# Patient Record
Sex: FEMALE | Race: BLACK OR AFRICAN AMERICAN | NOT HISPANIC OR LATINO | Employment: UNEMPLOYED | URBAN - METROPOLITAN AREA
[De-identification: names, ages, dates, MRNs, and addresses within clinical notes are randomized per-mention and may not be internally consistent; named-entity substitution may affect disease eponyms.]

---

## 2021-12-26 ENCOUNTER — HOSPITAL ENCOUNTER (EMERGENCY)
Facility: HOSPITAL | Age: 21
Discharge: HOME | End: 2021-12-26
Attending: EMERGENCY MEDICINE
Payer: MEDICAID

## 2021-12-26 VITALS
SYSTOLIC BLOOD PRESSURE: 141 MMHG | RESPIRATION RATE: 19 BRPM | HEART RATE: 89 BPM | OXYGEN SATURATION: 96 % | DIASTOLIC BLOOD PRESSURE: 77 MMHG | TEMPERATURE: 100 F

## 2021-12-26 DIAGNOSIS — U07.1 COVID-19: Primary | ICD-10-CM

## 2021-12-26 LAB
CTP QC/QA: YES
CTP QC/QA: YES
POC MOLECULAR INFLUENZA A AGN: NEGATIVE
POC MOLECULAR INFLUENZA B AGN: NEGATIVE
SARS-COV-2 RDRP RESP QL NAA+PROBE: POSITIVE

## 2021-12-26 PROCEDURE — 99283 EMERGENCY DEPT VISIT LOW MDM: CPT | Mod: 25

## 2021-12-26 PROCEDURE — U0002 COVID-19 LAB TEST NON-CDC: HCPCS | Performed by: EMERGENCY MEDICINE

## 2021-12-26 PROCEDURE — 25000003 PHARM REV CODE 250: Performed by: EMERGENCY MEDICINE

## 2021-12-26 RX ORDER — ONDANSETRON 4 MG/1
4 TABLET, ORALLY DISINTEGRATING ORAL EVERY 8 HOURS PRN
Qty: 11 TABLET | Refills: 0 | Status: SHIPPED | OUTPATIENT
Start: 2021-12-26 | End: 2021-12-29

## 2021-12-26 RX ORDER — ONDANSETRON 4 MG/1
4 TABLET, ORALLY DISINTEGRATING ORAL
Status: COMPLETED | OUTPATIENT
Start: 2021-12-26 | End: 2021-12-26

## 2021-12-26 RX ADMIN — ONDANSETRON 4 MG: 4 TABLET, ORALLY DISINTEGRATING ORAL at 05:12

## 2021-12-26 NOTE — Clinical Note
"Patti "Clarisa Kaur was seen and treated in our emergency department on 12/26/2021.     COVID-19 is present in our communities across the state. There is limited testing for COVID at this time, so not all patients can be tested. In this situation, your employee meets the following criteria:    Patti Kaur has met the criteria for COVID-19 testing and has a POSITIVE result. She can return to work once they are asymptomatic for 72 hours without the use of fever reducing medications AND at least ten days from the first positive result.     If you have any questions or concerns, or if I can be of further assistance, please do not hesitate to contact me.    Sincerely,             Cesar Mello MD"

## 2021-12-26 NOTE — ED PROVIDER NOTES
SCRIBE #1 NOTE: I, Cuate Jose, am scribing for, and in the presence of, Cesar Mello MD. I have scribed the entire note.       History     Chief Complaint   Patient presents with    COVID-19 Concerns     Pt reports that she has a fever and that she might be covid postive and wants to be tested.      Review of patient's allergies indicates:  No Known Allergies      History of Present Illness     HPI    12/26/2021, 4:44 PM  History obtained from the patient      History of Present Illness: Patti Kaur is a 21 y.o. female patient who presents to the Emergency Department for evaluation of COVID-19 concerns which onset gradually x 1 day pta. Pt is not vaccinated against COVID-19. Symptoms are constant and moderate in severity. No mitigating or exacerbating factors reported. Associated sxs include fever, n/v, generalized myalgias, cough, rhinorrhea and HA. Patient denies any SOB, CP, diarrhea, abd pain, dysuria, and all other sxs at this time. No prior tx reported. No further complaints or concerns at this time.       Arrival mode: Personal vehicle    PCP: Primary Doctor No        Past Medical History:  No past medical history on file.    Past Surgical History:  No past surgical history on file.      Family History:  No family history on file.    Social History:  Social History     Tobacco Use    Smoking status: Not on file    Smokeless tobacco: Not on file   Substance and Sexual Activity    Alcohol use: Not on file    Drug use: Not on file    Sexual activity: Not on file        Review of Systems     Review of Systems   Constitutional: Positive for fever. Negative for chills.   HENT: Positive for rhinorrhea. Negative for sore throat.    Respiratory: Positive for cough. Negative for shortness of breath.    Cardiovascular: Negative for chest pain.   Gastrointestinal: Positive for nausea and vomiting. Negative for abdominal pain and diarrhea.   Genitourinary: Negative for dysuria.   Musculoskeletal: Positive  for myalgias (Generalized). Negative for back pain.   Skin: Negative for rash.   Neurological: Positive for headaches. Negative for weakness.   Hematological: Does not bruise/bleed easily.   All other systems reviewed and are negative.     Physical Exam     Initial Vitals [12/26/21 1628]   BP Pulse Resp Temp SpO2   (!) 141/77 89 19 100.1 °F (37.8 °C) 96 %      MAP       --          Physical Exam  Nursing Notes and Vital Signs Reviewed.  Constitutional: Patient is in no acute distress. Well-developed and well-nourished.  Head: Atraumatic. Normocephalic.  Eyes: PERRL. EOM intact. Conjunctivae are not pale. No scleral icterus.  ENT: Mucous membranes are moist. Oropharynx is clear and symmetric.    Neck: Supple. Full ROM. No lymphadenopathy.  Cardiovascular: Regular rate. Regular rhythm. No murmurs, rubs, or gallops. Distal pulses are 2+ and symmetric.  Pulmonary/Chest: No respiratory distress. Clear to auscultation bilaterally. No wheezing or rales.  Abdominal: Soft and non-distended.  There is no tenderness.  No rebound, guarding, or rigidity. Good bowel sounds.  Genitourinary: No CVA tenderness  Musculoskeletal: Moves all extremities. No obvious deformities. No edema. No calf tenderness.  Skin: Warm and dry.  Neurological:  Alert, awake, and appropriate.  Normal speech.  No acute focal neurological deficits are appreciated.  Psychiatric: Normal affect. Good eye contact. Appropriate in content.     ED Course   Procedures  ED Vital Signs:  Vitals:    12/26/21 1628   BP: (!) 141/77   Pulse: 89   Resp: 19   Temp: 100.1 °F (37.8 °C)   TempSrc: Oral   SpO2: 96%       Abnormal Lab Results:  Labs Reviewed   SARS-COV-2 RDRP GENE - Abnormal; Notable for the following components:       Result Value    POC Rapid COVID Positive (*)     All other components within normal limits    Narrative:     This test utilizes isothermal nucleic acid amplification   technology to detect the SARS-CoV-2 RdRp nucleic acid segment.   The  analytical sensitivity (limit of detection) is 125 genome   equivalents/mL.   A POSITIVE result implies infection with the SARS-CoV-2 virus;   the patient is presumed to be contagious.     A NEGATIVE result means that SARS-CoV-2 nucleic acids are not   present above the limit of detection. A NEGATIVE result should be   treated as presumptive. It does not rule out the possibility of   COVID-19 and should not be the sole basis for treatment decisions.   If COVID-19 is strongly suspected based on clinical and exposure   history, re-testing using an alternate molecular assay should be   considered.   This test is only for use under the Food and Drug   Administration s Emergency Use Authorization (EUA).   Commercial kits are provided by [employer].   Performance characteristics of the EUA have been independently   verified by [facility] Department of   Pathology and Laboratory Medicine.   _________________________________________________________________   The authorized Fact Sheet for Healthcare Providers and the authorized Fact   Sheet for Patients of the ID NOW COVID-19 are available on the FDA   website:     https://www.fda.gov/media/496441/download  https://www.fda.gov/media/687412/download       POCT INFLUENZA A/B MOLECULAR    Narrative:     This test utilizes isothermal nucleic acid amplification   technology to detect the SARS-CoV-2 RdRp nucleic acid segment.   The analytical sensitivity (limit of detection) is 125 genome   equivalents/mL.   A POSITIVE result implies infection with the SARS-CoV-2 virus;   the patient is presumed to be contagious.     A NEGATIVE result means that SARS-CoV-2 nucleic acids are not   present above the limit of detection. A NEGATIVE result should be   treated as presumptive. It does not rule out the possibility of   COVID-19 and should not be the sole basis for treatment decisions.   If COVID-19 is strongly suspected based on clinical and exposure   history,  re-testing using an alternate molecular assay should be   considered.   This test is only for use under the Food and Drug   Administration s Emergency Use Authorization (EUA).   Commercial kits are provided by [employer].   Performance characteristics of the EUA have been independently   verified by [facility] Department of   Pathology and Laboratory Medicine.   _________________________________________________________________   The authorized Fact Sheet for Healthcare Providers and the authorized Fact   Sheet for Patients of the ID NOW COVID-19 are available on the FDA   website:     https://www.fda.gov/media/948106/download  https://www.fda.gov/media/365492/download            All Lab Results:  Results for orders placed or performed during the hospital encounter of 12/26/21   POCT COVID-19 Rapid Screening   Result Value Ref Range    POC Rapid COVID Positive (A) Negative     Acceptable Yes    POCT Influenza A/B Molecular   Result Value Ref Range    POC Molecular Influenza A Ag Negative Negative, Not Reported    POC Molecular Influenza B Ag Negative Negative, Not Reported     Acceptable Yes          Imaging Results:  Imaging Results    None              The Emergency Provider reviewed the vital signs and test results, which are outlined above.     ED Discussion     5:05 PM: Reassessed pt at this time.Discussed with pt all pertinent ED information and results. Discussed pt dx and plan of tx. Gave pt all f/u and return to the ED instructions. All questions and concerns were addressed at this time. Pt expresses understanding of information and instructions, and is comfortable with plan to discharge. Pt is stable for discharge.    I discussed with patient and/or family/caretaker that evaluation in the ED does not suggest any emergent or life threatening medical conditions requiring immediate intervention beyond what was provided in the ED, and I believe patient is safe  for discharge.  Regardless, an unremarkable evaluation in the ED does not preclude the development or presence of a serious of life threatening condition. As such, patient was instructed to return immediately for any worsening or change in current symptoms.       Medical Decision Making:   Clinical Tests:   Lab Tests: Ordered and Reviewed           ED Medication(s):  Medications   ondansetron disintegrating tablet 4 mg (4 mg Oral Given 12/26/21 1702)       Discharge Medication List as of 12/26/2021  5:05 PM      START taking these medications    Details   ondansetron (ZOFRAN-ODT) 4 MG TbDL Take 1 tablet (4 mg total) by mouth every 8 (eight) hours as needed (nausea)., Starting Sun 12/26/2021, Until Wed 12/29/2021 at 1571, Print              Follow-up Information     [facility] - Emergency Dept.. Go today.    Specialty: Emergency Medicine  Why: If symptoms worsen, For re-evaluation and further treatment, As needed  Contact information:  [id number] [place] [id number] [phone]           Your Primary Care Provider. Call in 2 days.    Why: For re-evaluation and further treatment                           Scribe Attestation:   Scribe #1: I performed the above scribed service and the documentation accurately describes the services I performed. I attest to the accuracy of the note.     Attending:   Physician Attestation Statement for Scribe #1: I, Cesar Mello MD, personally performed the services described in this documentation, as scribed by Cuate Garcia, in my presence, and it is both accurate and complete.           Clinical Impression       ICD-10-CM ICD-9-CM   1. COVID-19  U07.1 079.89       Disposition:   Disposition: Discharged  Condition: Stable         Cesar Mello MD  12/27/21 1410

## 2022-05-29 ENCOUNTER — HOSPITAL ENCOUNTER (EMERGENCY)
Facility: HOSPITAL | Age: 22
Discharge: HOME | End: 2022-05-29
Attending: EMERGENCY MEDICINE
Payer: MEDICAID

## 2022-05-29 VITALS
SYSTOLIC BLOOD PRESSURE: 128 MMHG | WEIGHT: 136 LBS | DIASTOLIC BLOOD PRESSURE: 71 MMHG | RESPIRATION RATE: 18 BRPM | OXYGEN SATURATION: 97 % | HEART RATE: 97 BPM | TEMPERATURE: 101 F

## 2022-05-29 DIAGNOSIS — U07.1 COVID-19: Primary | ICD-10-CM

## 2022-05-29 PROCEDURE — 87502 INFLUENZA DNA AMP PROBE: CPT

## 2022-05-29 PROCEDURE — 99283 EMERGENCY DEPT VISIT LOW MDM: CPT

## 2022-05-29 PROCEDURE — U0002 COVID-19 LAB TEST NON-CDC: HCPCS | Performed by: REGISTERED NURSE

## 2022-05-29 PROCEDURE — 25000003 PHARM REV CODE 250: Performed by: REGISTERED NURSE

## 2022-05-29 RX ORDER — ONDANSETRON 4 MG/1
4 TABLET, ORALLY DISINTEGRATING ORAL
Status: COMPLETED | OUTPATIENT
Start: 2022-05-29 | End: 2022-05-29

## 2022-05-29 RX ORDER — ACETAMINOPHEN 500 MG
1000 TABLET ORAL
Status: COMPLETED | OUTPATIENT
Start: 2022-05-29 | End: 2022-05-29

## 2022-05-29 RX ADMIN — ONDANSETRON 4 MG: 4 TABLET, ORALLY DISINTEGRATING ORAL at 09:05

## 2022-05-29 RX ADMIN — ACETAMINOPHEN 1000 MG: 500 TABLET ORAL at 09:05

## 2022-05-30 NOTE — ED PROVIDER NOTES
Encounter Date: 5/29/2022       History     Chief Complaint   Patient presents with    COVID-19 Concerns     States started with sore throat headache , body aches, N/V today     The history is provided by the patient.   22-year-old female presents emergency department with complaints of sore throat, headache, body aches and nausea vomiting that began today.  Patient denies any abdominal pain, shortness of breath, chest pain or any other symptoms at this time.    Review of patient's allergies indicates:  No Known Allergies  No past medical history on file.  No past surgical history on file.  No family history on file.     Review of Systems   Constitutional: Positive for activity change, appetite change and fatigue. Negative for fever.   HENT: Positive for sore throat.    Respiratory: Negative for shortness of breath.    Cardiovascular: Negative for chest pain.   Gastrointestinal: Positive for vomiting. Negative for nausea.   Genitourinary: Negative for dysuria.   Musculoskeletal: Positive for myalgias. Negative for back pain.   Skin: Negative for rash.   Neurological: Positive for headaches. Negative for weakness.   Hematological: Does not bruise/bleed easily.   All other systems reviewed and are negative.      Physical Exam     Initial Vitals [05/29/22 2055]   BP Pulse Resp Temp SpO2   128/71 97 18 (!) 101.1 °F (38.4 °C) 97 %      MAP       --         Physical Exam    Constitutional: She appears well-developed and well-nourished. She is not diaphoretic. No distress.   HENT:   Head: Normocephalic and atraumatic.   Eyes: Conjunctivae and EOM are normal. Pupils are equal, round, and reactive to light.   Neck: Neck supple.   Normal range of motion.  Cardiovascular: Normal rate, regular rhythm and normal heart sounds.   No murmur heard.  Pulmonary/Chest: Breath sounds normal. No respiratory distress. She has no wheezes. She has no rales.   Abdominal: Abdomen is soft. Bowel sounds are normal. There is no abdominal  tenderness. There is no rebound and no guarding.   Musculoskeletal:         General: No tenderness or edema. Normal range of motion.      Cervical back: Normal range of motion and neck supple.     Neurological: She is alert and oriented to person, place, and time. No cranial nerve deficit. GCS score is 15. GCS eye subscore is 4. GCS verbal subscore is 5. GCS motor subscore is 6.   Skin: Skin is warm and dry. Capillary refill takes less than 2 seconds.   Psychiatric: She has a normal mood and affect. Thought content normal.         ED Course   Procedures  Labs Reviewed   SARS-COV-2 RDRP GENE - Abnormal; Notable for the following components:       Result Value    POC Rapid COVID Positive (*)     All other components within normal limits   INFLUENZA A & B BY MOLECULAR   SARS-COV-2 RNA AMPLIFICATION, QUAL   POCT INFLUENZA A/B MOLECULAR          Imaging Results    None          Medications   acetaminophen tablet 1,000 mg (1,000 mg Oral Given 5/29/22 2105)   ondansetron disintegrating tablet 4 mg (4 mg Oral Given 5/29/22 2134)          I discussed with patient and/or family/caretaker that evaluation in the ED does not suggest any emergent or life threatening medical conditions requiring immediate intervention beyond what was provided in the ED, and I believe patient is safe for discharge.  Regardless, an unremarkable evaluation in the ED does not preclude the development or presence of a serious of life threatening condition. As such, patient was instructed to return immediately for any worsening or change in current symptoms.                   Clinical Impression:   Final diagnoses:  [U07.1] COVID-19 (Primary)          ED Disposition Condition    Discharge Stable        ED Prescriptions     None        Follow-up Information     Follow up With Specialties Details Why Contact Info    [facility] - Emergency Dept. Emergency Medicine  If symptoms worsen [id number] [facility] [address] [phone]            John Edward Jr., [facility]  05/30/22 0039

## 2022-05-31 ENCOUNTER — PATIENT OUTREACH (OUTPATIENT)
Dept: EMERGENCY MEDICINE | Facility: HOSPITAL | Age: 22
End: 2022-05-31
Payer: MEDICAID

## 2022-06-14 ENCOUNTER — PATIENT OUTREACH (OUTPATIENT)
Dept: EMERGENCY MEDICINE | Facility: HOSPITAL | Age: 22
End: 2022-06-14
Payer: MEDICAID

## 2022-06-28 ENCOUNTER — PATIENT OUTREACH (OUTPATIENT)
Dept: EMERGENCY MEDICINE | Facility: HOSPITAL | Age: 22
End: 2022-06-28
Payer: MEDICAID

## 2022-07-13 ENCOUNTER — PATIENT OUTREACH (OUTPATIENT)
Dept: ADMINISTRATIVE | Facility: HOSPITAL | Age: 22
End: 2022-07-13
Payer: MEDICAID

## 2022-07-13 NOTE — PROGRESS NOTES
Called patient to confirm hospital follow up scheduled on 7/15/2022.   Patient is not confirmed.  LM to confirm appt

## 2022-07-15 ENCOUNTER — TELEPHONE (OUTPATIENT)
Dept: PRIMARY CARE CLINIC | Facility: CLINIC | Age: 22
End: 2022-07-15
Payer: MEDICAID

## 2022-09-19 ENCOUNTER — HOSPITAL ENCOUNTER (EMERGENCY)
Facility: HOSPITAL | Age: 22
Discharge: HOME | End: 2022-09-19
Attending: EMERGENCY MEDICINE
Payer: MEDICAID

## 2022-09-19 VITALS
TEMPERATURE: 99 F | HEART RATE: 57 BPM | SYSTOLIC BLOOD PRESSURE: 143 MMHG | DIASTOLIC BLOOD PRESSURE: 83 MMHG | OXYGEN SATURATION: 97 % | WEIGHT: 132.06 LBS | RESPIRATION RATE: 18 BRPM

## 2022-09-19 DIAGNOSIS — R21 RASH AND NONSPECIFIC SKIN ERUPTION: Primary | ICD-10-CM

## 2022-09-19 PROCEDURE — 99284 EMERGENCY DEPT VISIT MOD MDM: CPT

## 2022-09-19 RX ORDER — PREDNISONE 20 MG/1
40 TABLET ORAL DAILY
Qty: 10 TABLET | Refills: 0 | Status: SHIPPED | OUTPATIENT
Start: 2022-09-19 | End: 2022-09-24

## 2022-09-19 RX ORDER — HYDROXYZINE HYDROCHLORIDE 25 MG/1
25 TABLET, FILM COATED ORAL 3 TIMES DAILY
Qty: 20 TABLET | Refills: 0 | Status: SHIPPED | OUTPATIENT
Start: 2022-09-19 | End: 2023-02-27

## 2022-09-19 RX ORDER — PERMETHRIN 50 MG/G
CREAM TOPICAL
Qty: 60 G | Refills: 1 | Status: SHIPPED | OUTPATIENT
Start: 2022-09-19 | End: 2023-02-27

## 2022-09-19 NOTE — ED PROVIDER NOTES
Encounter Date: 9/19/2022       History     Chief Complaint   Patient presents with    Rash     Pt reports that she went to [facility] for a few days and when she got out she began to experience bumps throughout her body      Patient is a 22-year-old female who presents with complaints of rash to the elbows, axilla and posterior neck.  Onset of symptoms 2 days ago.  Patient reports rash started after getting out of [facility].  States that she has itching all over body.  Patient denies any allergies.  No distress this time.    Review of patient's allergies indicates:  No Known Allergies  No past medical history on file.  No past surgical history on file.  No family history on file.     Review of Systems   Constitutional:  Negative for fever.   HENT:  Negative for sore throat.    Respiratory:  Negative for shortness of breath.    Cardiovascular:  Negative for chest pain.   Gastrointestinal:  Negative for nausea.   Genitourinary:  Negative for dysuria.   Musculoskeletal:  Negative for back pain.   Skin:  Positive for rash.   Neurological:  Negative for weakness.   Hematological:  Does not bruise/bleed easily.     Physical Exam     Initial Vitals [09/19/22 0025]   BP Pulse Resp Temp SpO2   (!) 143/83 (!) 57 18 98.7 °F (37.1 °C) 97 %      MAP       --         Physical Exam    Nursing note and vitals reviewed.  Constitutional: She appears well-developed and well-nourished.   HENT:   Head: Normocephalic and atraumatic.   Eyes: EOM are normal. Pupils are equal, round, and reactive to light.   Neck: Neck supple.   Normal range of motion.  Cardiovascular:  Normal rate, regular rhythm, normal heart sounds and intact distal pulses.           Pulmonary/Chest: Breath sounds normal.   Abdominal: Abdomen is soft. Bowel sounds are normal.   Musculoskeletal:         General: Normal range of motion.      Cervical back: Normal range of motion and neck supple.     Neurological: She is alert and oriented to person, place, and time. She has normal  strength and normal reflexes.   Skin: Skin is warm and dry. Rash (rash noted to the elbows, neck, axilla that appears consistent to allergic reaction versus scabies.) noted.       ED Course   Procedures  Labs Reviewed - No data to display       Imaging Results    None          Medications - No data to display  Medical Decision Making:   ED Management:  I discussed with patient and/or family/caretaker that evaluation in the ED does not suggest any emergent or life threatening medical conditions requiring immediate intervention beyond what was provided in the ED, and I believe patient is safe for discharge. Regardless, an unremarkable evaluation in the ED does not preclude the development or presence of a serious of life threatening condition. As such, patient was instructed to return immediately for any worsening or change in current symptoms.                          Clinical Impression:   Final diagnoses:  [R21] Rash and nonspecific skin eruption (Primary)      ED Disposition Condition    Discharge Stable          ED Prescriptions       Medication Sig Dispense Start Date End Date Auth. Provider    permethrin (ELIMITE) 5 % cream Apply to affected area once 60 g 9/19/2022 -- Ronal Mitchell NP    predniSONE (DELTASONE) 20 MG tablet Take 2 tablets (40 mg total) by mouth once daily. for 5 days 10 tablet 9/19/2022 9/24/2022 Ronal Mitchell NP    hydrOXYzine HCL (ATARAX) 25 MG tablet Take 1 tablet (25 mg total) by mouth 3 (three) times daily. 20 tablet 9/19/2022 -- Ronal Mitchell NP          Follow-up Information    None          Ronal Mitchell NP  09/19/22 0139

## 2023-02-27 ENCOUNTER — OFFICE VISIT (OUTPATIENT)
Dept: OBSTETRICS AND GYNECOLOGY | Facility: CLINIC | Age: 23
End: 2023-02-27
Payer: MEDICAID

## 2023-02-27 ENCOUNTER — LAB VISIT (OUTPATIENT)
Dept: LAB | Facility: HOSPITAL | Age: 23
End: 2023-02-27
Attending: NURSE PRACTITIONER
Payer: MEDICAID

## 2023-02-27 VITALS — DIASTOLIC BLOOD PRESSURE: 68 MMHG | SYSTOLIC BLOOD PRESSURE: 113 MMHG | WEIGHT: 136.25 LBS

## 2023-02-27 DIAGNOSIS — Z11.3 SCREENING FOR STD (SEXUALLY TRANSMITTED DISEASE): Primary | ICD-10-CM

## 2023-02-27 DIAGNOSIS — Z11.3 SCREENING FOR STD (SEXUALLY TRANSMITTED DISEASE): ICD-10-CM

## 2023-02-27 PROCEDURE — 99999 PR PBB SHADOW E&M-EST. PATIENT-LVL II: CPT | Mod: PBBFAC,,, | Performed by: NURSE PRACTITIONER

## 2023-02-27 PROCEDURE — 3078F PR MOST RECENT DIASTOLIC BLOOD PRESSURE < 80 MM HG: ICD-10-PCS | Mod: CPTII,,, | Performed by: NURSE PRACTITIONER

## 2023-02-27 PROCEDURE — 1159F PR MEDICATION LIST DOCUMENTED IN MEDICAL RECORD: ICD-10-PCS | Mod: CPTII,,, | Performed by: NURSE PRACTITIONER

## 2023-02-27 PROCEDURE — 99999 PR PBB SHADOW E&M-EST. PATIENT-LVL II: ICD-10-PCS | Mod: PBBFAC,,, | Performed by: NURSE PRACTITIONER

## 2023-02-27 PROCEDURE — 99499 NO LOS: ICD-10-PCS | Mod: S$PBB,,, | Performed by: NURSE PRACTITIONER

## 2023-02-27 PROCEDURE — 3074F PR MOST RECENT SYSTOLIC BLOOD PRESSURE < 130 MM HG: ICD-10-PCS | Mod: CPTII,,, | Performed by: NURSE PRACTITIONER

## 2023-02-27 PROCEDURE — 99499 UNLISTED E&M SERVICE: CPT | Mod: S$PBB,,, | Performed by: NURSE PRACTITIONER

## 2023-02-27 PROCEDURE — 1160F RVW MEDS BY RX/DR IN RCRD: CPT | Mod: CPTII,,, | Performed by: NURSE PRACTITIONER

## 2023-02-27 PROCEDURE — 3078F DIAST BP <80 MM HG: CPT | Mod: CPTII,,, | Performed by: NURSE PRACTITIONER

## 2023-02-27 PROCEDURE — 99212 OFFICE O/P EST SF 10 MIN: CPT | Mod: PBBFAC,PN | Performed by: NURSE PRACTITIONER

## 2023-02-27 PROCEDURE — 80074 ACUTE HEPATITIS PANEL: CPT | Performed by: NURSE PRACTITIONER

## 2023-02-27 PROCEDURE — 86592 SYPHILIS TEST NON-TREP QUAL: CPT | Performed by: NURSE PRACTITIONER

## 2023-02-27 PROCEDURE — 1160F PR REVIEW ALL MEDS BY PRESCRIBER/CLIN PHARMACIST DOCUMENTED: ICD-10-PCS | Mod: CPTII,,, | Performed by: NURSE PRACTITIONER

## 2023-02-27 PROCEDURE — 1159F MED LIST DOCD IN RCRD: CPT | Mod: CPTII,,, | Performed by: NURSE PRACTITIONER

## 2023-02-27 PROCEDURE — 3074F SYST BP LT 130 MM HG: CPT | Mod: CPTII,,, | Performed by: NURSE PRACTITIONER

## 2023-02-27 PROCEDURE — 87389 HIV-1 AG W/HIV-1&-2 AB AG IA: CPT | Performed by: NURSE PRACTITIONER

## 2023-02-27 PROCEDURE — 36415 COLL VENOUS BLD VENIPUNCTURE: CPT | Mod: PN | Performed by: NURSE PRACTITIONER

## 2023-02-27 NOTE — PROGRESS NOTES
Pt currently on cycle and heavy, needs pap  Wants std testing  Will do blood STD workup today  And reschedule for annual exam next week

## 2023-02-28 LAB
HAV IGM SERPL QL IA: NORMAL
HBV CORE IGM SERPL QL IA: NORMAL
HBV SURFACE AG SERPL QL IA: NORMAL
HCV AB SERPL QL IA: NORMAL
HIV 1+2 AB+HIV1 P24 AG SERPL QL IA: NORMAL
RPR SER QL: NORMAL

## 2023-03-07 ENCOUNTER — LAB VISIT (OUTPATIENT)
Dept: LAB | Facility: HOSPITAL | Age: 23
End: 2023-03-07
Payer: MEDICAID

## 2023-03-07 ENCOUNTER — OFFICE VISIT (OUTPATIENT)
Dept: OBSTETRICS AND GYNECOLOGY | Facility: CLINIC | Age: 23
End: 2023-03-07
Payer: MEDICAID

## 2023-03-07 VITALS
SYSTOLIC BLOOD PRESSURE: 110 MMHG | HEIGHT: 63 IN | WEIGHT: 134.25 LBS | DIASTOLIC BLOOD PRESSURE: 86 MMHG | BODY MASS INDEX: 23.79 KG/M2

## 2023-03-07 DIAGNOSIS — Z11.3 SCREEN FOR STD (SEXUALLY TRANSMITTED DISEASE): ICD-10-CM

## 2023-03-07 DIAGNOSIS — Z01.419 WELL WOMAN EXAM WITH ROUTINE GYNECOLOGICAL EXAM: Primary | ICD-10-CM

## 2023-03-07 DIAGNOSIS — N64.3 GALACTORRHEA IN FEMALE: ICD-10-CM

## 2023-03-07 PROCEDURE — 3074F SYST BP LT 130 MM HG: CPT | Mod: CPTII,,,

## 2023-03-07 PROCEDURE — 1159F PR MEDICATION LIST DOCUMENTED IN MEDICAL RECORD: ICD-10-PCS | Mod: CPTII,,,

## 2023-03-07 PROCEDURE — 3074F PR MOST RECENT SYSTOLIC BLOOD PRESSURE < 130 MM HG: ICD-10-PCS | Mod: CPTII,,,

## 2023-03-07 PROCEDURE — 84702 CHORIONIC GONADOTROPIN TEST: CPT

## 2023-03-07 PROCEDURE — 99395 PR PREVENTIVE VISIT,EST,18-39: ICD-10-PCS | Mod: S$PBB,,,

## 2023-03-07 PROCEDURE — 1159F MED LIST DOCD IN RCRD: CPT | Mod: CPTII,,,

## 2023-03-07 PROCEDURE — 99213 OFFICE O/P EST LOW 20 MIN: CPT | Mod: PBBFAC

## 2023-03-07 PROCEDURE — 84439 ASSAY OF FREE THYROXINE: CPT

## 2023-03-07 PROCEDURE — 84146 ASSAY OF PROLACTIN: CPT

## 2023-03-07 PROCEDURE — 99999 PR PBB SHADOW E&M-EST. PATIENT-LVL III: CPT | Mod: PBBFAC,,,

## 2023-03-07 PROCEDURE — 3079F PR MOST RECENT DIASTOLIC BLOOD PRESSURE 80-89 MM HG: ICD-10-PCS | Mod: CPTII,,,

## 2023-03-07 PROCEDURE — 84443 ASSAY THYROID STIM HORMONE: CPT

## 2023-03-07 PROCEDURE — 99395 PREV VISIT EST AGE 18-39: CPT | Mod: S$PBB,,,

## 2023-03-07 PROCEDURE — 99999 PR PBB SHADOW E&M-EST. PATIENT-LVL III: ICD-10-PCS | Mod: PBBFAC,,,

## 2023-03-07 PROCEDURE — 3079F DIAST BP 80-89 MM HG: CPT | Mod: CPTII,,,

## 2023-03-07 PROCEDURE — 3008F BODY MASS INDEX DOCD: CPT | Mod: CPTII,,,

## 2023-03-07 PROCEDURE — 3008F PR BODY MASS INDEX (BMI) DOCUMENTED: ICD-10-PCS | Mod: CPTII,,,

## 2023-03-07 PROCEDURE — 87591 N.GONORRHOEAE DNA AMP PROB: CPT

## 2023-03-07 PROCEDURE — 88142 CYTOPATH C/V THIN LAYER: CPT

## 2023-03-07 PROCEDURE — 36415 COLL VENOUS BLD VENIPUNCTURE: CPT

## 2023-03-07 RX ORDER — AMOXICILLIN 500 MG/1
500 TABLET, FILM COATED ORAL EVERY 12 HOURS
COMMUNITY

## 2023-03-07 NOTE — PROGRESS NOTES
Subjective:       Patient ID: Patti Kaur is a 22 y.o. female.    Chief Complaint:  Well Woman      History of Present Illness  HPI  Annual Exam-Premenopausal  Patient presents for annual exam. The patient has no complaints today. The patient is sexually active. GYN screening history: last pap: patient has never had a pap test. The patient wears seatbelts: yes. The patient participates in regular exercise: not asked. Has the patient ever been transfused or tattooed?: yes tattoo. The patient reports that there is not domestic violence in her life.    Pt does c/o occasional pelvic discomfort during vaginal intercourse with her boyfriend. Reports being adequately lubricated and aroused during the act. Only in certain positions. Was evaluated at [facility] via pelvic US and was wnl.     GYN & OB History  Patient's last menstrual period was 2023 (exact date).   Date of Last Pap: 3/7/2023    OB History    Para Term  AB Living   1         1   SAB IAB Ectopic Multiple Live Births           1      # Outcome Date GA Lbr Laci/2nd Weight Sex Delivery Anes PTL Lv   1  19    F Vag-Spont   WALTER       Review of Systems  Review of Systems   Constitutional:  Negative for activity change, appetite change, chills, fatigue and fever.   HENT:  Negative for nasal congestion and mouth sores.    Respiratory:  Negative for cough, shortness of breath and wheezing.    Cardiovascular:  Negative for chest pain.   Gastrointestinal:  Negative for abdominal pain, constipation, diarrhea, nausea and vomiting.   Endocrine: Negative for hair loss and hot flashes.   Genitourinary:  Positive for dyspareunia. Negative for bladder incontinence, decreased libido, dysmenorrhea, dysuria, frequency, genital sores, hematuria, hot flashes, menorrhagia, menstrual problem, pelvic pain, urgency, vaginal discharge, vaginal pain, urinary incontinence, postcoital bleeding, vaginal dryness and vaginal odor.    Musculoskeletal:  Negative for back pain.   Integumentary:  Positive for nipple discharge (pt reported bilateral galactorrhea during clinical breast exam today. States it only happens when she squeezes). Negative for breast mass, breast skin changes and breast tenderness.   Neurological:  Negative for headaches.   Hematological:  Negative for adenopathy.   Psychiatric/Behavioral:  Negative for depression and sleep disturbance. The patient is not nervous/anxious.    All other systems reviewed and are negative.  Breast: Positive for breast self exam and nipple discharge (pt reported bilateral galactorrhea during clinical breast exam today. States it only happens when she squeezes).Negative for lump, mass, mastodynia, skin changes and tenderness        Objective:    Physical Exam:   Constitutional: She is oriented to person, place, and time. She appears well-developed and well-nourished. No distress.    HENT:   Head: Normocephalic and atraumatic.   Nose: Nose normal.    Eyes: Pupils are equal, round, and reactive to light. Conjunctivae and EOM are normal. Right eye exhibits no discharge. Left eye exhibits no discharge.     Cardiovascular:  Normal rate, regular rhythm and normal heart sounds.      Exam reveals no clubbing, no cyanosis and no edema.        Pulmonary/Chest: Effort normal and breath sounds normal. No respiratory distress. She has no decreased breath sounds. She has no wheezes. She has no rhonchi. She has no rales. She exhibits no tenderness. Right breast exhibits no inverted nipple, no mass, no nipple discharge, no skin change, no tenderness, no bleeding and no swelling. Left breast exhibits no inverted nipple, no mass, no nipple discharge, no skin change, no tenderness, no bleeding and no swelling. Breasts are symmetrical.        Abdominal: Soft. Bowel sounds are normal. She exhibits no distension. There is no abdominal tenderness. There is no rebound and no guarding. Hernia confirmed negative in the  right inguinal area and confirmed negative in the left inguinal area.     Genitourinary:    Inguinal canal, vagina, uterus, right adnexa and left adnexa normal.   Rectum:      No external hemorrhoid.      Pelvic exam was performed with patient supine.   The external female genitalia was normal.   No external genitalia lesions identified,Genitalia hair distrobution normal .   Labial bartholins normal.There is no rash, tenderness, lesion or injury on the right labia. There is no rash, tenderness, lesion or injury on the left labia. Cervix is normal. Right adnexum displays no mass, no tenderness and no fullness. Left adnexum displays no mass, no tenderness and no fullness. No erythema,  no vaginal discharge, tenderness or bleeding in the vagina.    No foreign body in the vagina.      No signs of injury in the vagina.   Vagina was moist.Cervix exhibits no motion tenderness, no lesion, no discharge, no friability, no lesion, no tenderness and no polyp.    pap smear completedUerus contour normal  Uterus is not enlarged and not tender. Normal urethral meatus.Urethral Meatus exhibits: prolapsedBladder findings: no bladder distention and no bladder tenderness          Musculoskeletal: Normal range of motion and moves all extremeties.      Lymphadenopathy: No inguinal adenopathy noted on the right or left side.    Neurological: She is alert and oriented to person, place, and time.    Skin: Skin is warm and dry. No rash noted. She is not diaphoretic. No cyanosis or erythema. No pallor. Nails show no clubbing.    Psychiatric: She has a normal mood and affect. Her speech is normal and behavior is normal. Judgment and thought content normal.        Assessment:        1. Well woman exam with routine gynecological exam    2. Screen for STD (sexually transmitted disease)    3. Galactorrhea in female                Plan:      Well woman exam with routine gynecological exam  -     Liquid-Based Pap Smear, Screening    Screen for STD  (sexually transmitted disease)  -     C. trachomatis/N. gonorrhoeae by AMP DNA [facility]; Vagina    Galactorrhea in female  -     PROLACTIN; Future; Expected date: 03/07/2023  -     TSH; Future; Expected date: 03/07/2023  -     T4, FREE; Future; Expected date: 03/07/2023  -     HCG, QUANTITATIVE, PREGNANCY; Future; Expected date: 03/07/2023    Pt will monitor occurences of pelvic pain with intercourse and if it persists will return for a pelvic US for further eval.    Follow up in about 1 year (around 3/7/2024) for annual exam, and pending results.

## 2023-03-08 LAB
HCG INTACT+B SERPL-ACNC: <2.4 MIU/ML
PROLACTIN SERPL IA-MCNC: 4.6 NG/ML (ref 5.2–26.5)
T4 FREE SERPL-MCNC: 0.91 NG/DL (ref 0.71–1.51)
TSH SERPL DL<=0.005 MIU/L-ACNC: 0.62 UIU/ML (ref 0.4–4)

## 2023-03-09 LAB
C TRACH DNA SPEC QL NAA+PROBE: NOT DETECTED
N GONORRHOEA DNA SPEC QL NAA+PROBE: NOT DETECTED

## 2023-03-13 ENCOUNTER — PATIENT MESSAGE (OUTPATIENT)
Dept: PAIN MEDICINE | Facility: CLINIC | Age: 23
End: 2023-03-13
Payer: MEDICAID

## 2023-03-13 LAB
FINAL PATHOLOGIC DIAGNOSIS: NORMAL
Lab: NORMAL

## 2023-03-14 NOTE — PROGRESS NOTES
Called pt confirmed pt identifiers informed pt of negative(normal) pap smear results, pt verbalized understanding